# Patient Record
Sex: FEMALE | Race: WHITE | NOT HISPANIC OR LATINO | URBAN - METROPOLITAN AREA
[De-identification: names, ages, dates, MRNs, and addresses within clinical notes are randomized per-mention and may not be internally consistent; named-entity substitution may affect disease eponyms.]

---

## 2020-04-18 ENCOUNTER — PATIENT MESSAGE (OUTPATIENT)
Dept: OBSTETRICS AND GYNECOLOGY | Facility: CLINIC | Age: 35
End: 2020-04-18

## 2023-09-06 ENCOUNTER — OFFICE VISIT (OUTPATIENT)
Dept: SURGERY | Facility: CLINIC | Age: 38
End: 2023-09-06
Payer: COMMERCIAL

## 2023-09-06 VITALS
WEIGHT: 125 LBS | HEIGHT: 68 IN | RESPIRATION RATE: 16 BRPM | BODY MASS INDEX: 18.94 KG/M2 | SYSTOLIC BLOOD PRESSURE: 118 MMHG | DIASTOLIC BLOOD PRESSURE: 72 MMHG | HEART RATE: 72 BPM

## 2023-09-06 DIAGNOSIS — K80.20 GALLSTONES: Primary | ICD-10-CM

## 2023-09-06 DIAGNOSIS — Z72.0 TOBACCO USE: ICD-10-CM

## 2023-09-06 PROCEDURE — 99204 OFFICE O/P NEW MOD 45 MIN: CPT | Performed by: SURGERY

## 2023-09-06 NOTE — PATIENT INSTRUCTIONS
Steps to Quit Smoking  Smoking tobacco is the leading cause of preventable death. It can affect almost every organ in the body. Smoking puts you and those around you at risk for developing many serious chronic diseases.  Quitting smoking can be very challenging. Do not get discouraged if you are not successful the first time. Some people need to make many attempts to quit before they achieve long-term success. Do your best to stick to your quit plan, and talk with your health care provider if you have any questions or concerns.  How do I get ready to quit?  When you decide to quit smoking, create a plan to help you succeed. Before you quit:  Pick a date to quit. Set a date within the next 2 weeks to give you time to prepare.  Write down the reasons why you are quitting. Keep this list in places where you will see it often.  Tell your family, friends, and co-workers that you are quitting. Support from people you are close to can make quitting easier.  Talk with your health care provider about your options for quitting smoking.  Find out what treatment options are covered by your health insurance.  Identify people, places, things, and activities that make you want to smoke (triggers). Avoid them.  What first steps can I take to quit smoking?  Throw away all cigarettes at home, at work, and in your car.  Throw away smoking accessories, such as ashtrays and lighters.  Clean your car. Make sure to empty the ashtray.  Clean your home, including curtains and carpets.  What strategies can I use to quit smoking?  Talk with your health care provider about combining strategies, such as taking medicines while you are also receiving in-person counseling. Using these two strategies together makes you more likely to succeed in quitting than if you used either strategy on its own.  If you are pregnant or breastfeeding, talk with your health care provider about finding counseling or other support strategies to quit smoking. Do not  take medicine to help you quit smoking unless your health care provider tells you to.  Quit right away  Quit smoking completely, instead of gradually reducing how much you smoke over a period of time. Stopping smoking right away may be more successful than gradually quitting.  Attend in-person counseling to help you build problem-solving skills. You are more likely to succeed in quitting if you attend counseling sessions regularly. Even short sessions of 10 minutes can be effective.  Take medicine  You may take medicines to help you quit smoking. Some medicines require a prescription. You can also purchase over-the-counter medicines. Medicines may have nicotine in them to replace the nicotine in cigarettes. Medicines may:  Help to stop cravings.  Help to relieve withdrawal symptoms.  Your health care provider may recommend:  Nicotine patches, gum, or lozenges.  Nicotine inhalers or sprays.  Non-nicotine medicine that you take by mouth.  Find resources  Find resources and support systems that can help you quit smoking and remain smoke-free after you quit. These resources are most helpful when you use them often. They include:  Online chats with a counselor.  Telephone quitlines.  Printed self-help materials.  Support groups or group counseling.  Text messaging programs.  Mobile phone apps or applications. Use apps that can help you stick to your quit plan by providing reminders, tips, and encouragement. Examples of free services include Quit Guide from the CDC and smokefree.gov    What can I do to make it easier to quit?    Reach out to your family and friends for support and encouragement. Call telephone quitlines, such as 1800-QUIT-NOW, reach out to support groups, or work with a counselor for support.  Ask people who smoke to avoid smoking around you.  Avoid places that trigger you to smoke, such as bars, parties, or smoke-break areas at work.  Spend time with people who do not smoke.  Lessen the stress in your  life. Stress can be a smoking trigger for some people. To lessen stress, try:  Exercising regularly.  Doing deep-breathing exercises.  Doing yoga.  Meditating.  What benefits will I see if I quit smoking?  Over time, you should start to see positive results, such as:  Improved sense of smell and taste.  Decreased coughing and sore throat.  Slower heart rate.  Lower blood pressure.  Clearer and healthier skin.  The ability to breathe more easily.  Fewer sick days.  Summary  Quitting smoking can be very challenging. Do not get discouraged if you are not successful the first time. Some people need to make many attempts to quit before they achieve long-term success.  When you decide to quit smoking, create a plan to help you succeed.  Quit smoking right away, not slowly over a period of time.  Find resources and support systems that can help you quit smoking and remain smoke-free after you quit.  This information is not intended to replace advice given to you by your health care provider. Make sure you discuss any questions you have with your health care provider.  Document Revised: 12/09/2022 Document Reviewed: 12/09/2022  Elsevier Patient Education © 2023 [employer]

## 2023-09-06 NOTE — H&P
Roldan Horton 38 y.o. female presents @ the req of ESSENCE Torres for eval of gallstones.  Pt reports + diarrhea after meals.   Chief Complaint   Patient presents with    Cholelithiasis             HPI   Above noted and agree.  This very pleasant 38-year-old female has had abdominal issues for quite some time.  She been having issues right upper quadrant pain and nausea and bloating.  She also had issues with diarrhea.  She is very healthy and has no medical issues.  She does smoke cigarettes.  She had an ultrasound of the abdomen that showed gallstones.      Review of Systems   All other systems reviewed and are negative.          Current Outpatient Medications:     folic acid (FOLVITE) 1 MG tablet, Take 1 tablet by mouth Daily., Disp: , Rfl:     vitamin D (ERGOCALCIFEROL) 1.25 MG (75840 UT) capsule capsule, Take 1 capsule by mouth 1 (One) Time Per Week., Disp: , Rfl:         No Known Allergies        History reviewed. No pertinent past medical history.        Past Surgical History:   Procedure Laterality Date    HEMORRHOIDECTOMY             Social History     Tobacco Use    Smoking status: Every Day     Types: Cigarettes    Smokeless tobacco: Never   Substance Use Topics    Alcohol use: Yes             There is no immunization history on file for this patient.        Physical Exam  Vitals and nursing note reviewed.   Constitutional:       Appearance: Normal appearance.   HENT:      Head: Normocephalic and atraumatic.   Cardiovascular:      Rate and Rhythm: Normal rate and regular rhythm.   Pulmonary:      Effort: Pulmonary effort is normal.      Breath sounds: Normal breath sounds.   Abdominal:      General: Bowel sounds are normal.      Palpations: Abdomen is soft.   Musculoskeletal:         General: No swelling or tenderness.   Skin:     General: Skin is warm and dry.   Neurological:      General: No focal deficit present.      Mental Status: She is alert and oriented to person, place, and time.  "  Psychiatric:         Mood and Affect: Mood normal.         Behavior: Behavior normal.       Debilities/Disabilities Identified: None    Emotional Behavior: Appropriate      /72   Pulse 72   Resp 16   Ht 172.7 cm (68\")   Wt 56.7 kg (125 lb)   BMI 19.01 kg/m²         Diagnoses and all orders for this visit:    1. Gallstones (Primary)    I discussed with the patient the benefits and risks of performing a laparoscopic cholecystectomy with intraoperative cholangiogram possible open procedure.  Benefits and risks not limited to but including: Bleeding, infection, hernia formation, having to convert to an open procedure, injury to intra-abdominal structures, intra-abdominal abscess, intra-abdominal biloma, bile leak, DVT, PE, atelectasis, pneumonia, anesthetic complications.  The patient appeared to understand and is willing to proceed.    Thank you for allowing me to participate in the care of this interesting patient.          "

## 2023-09-06 NOTE — LETTER
September 6, 2023       No Recipients    Patient: Roldan Horton   YOB: 1985   Date of Visit: 9/6/2023     Dear ESSENCE Kenny:       Thank you for referring Roldan Horton to me for evaluation. Below are the relevant portions of my assessment and plan of care.    If you have questions, please do not hesitate to call me. I look forward to following Roldan along with you.         Sincerely,        Montse Anderson DO        CC:   No Recipients    Montse Anderson DO  09/06/23 1535  Sign when Signing Visit  Roldan Horton 38 y.o. female presents @ the req of ESSENCE Torres for eval of gallstones.  Pt reports + diarrhea after meals.   Chief Complaint   Patient presents with   • Cholelithiasis             HPI   Above noted and agree.  This very pleasant 38-year-old female has had abdominal issues for quite some time.  She been having issues right upper quadrant pain and nausea and bloating.  She also had issues with diarrhea.  She is very healthy and has no medical issues.  She does smoke cigarettes.  She had an ultrasound of the abdomen that showed gallstones.      Review of Systems   All other systems reviewed and are negative.          Current Outpatient Medications:   •  folic acid (FOLVITE) 1 MG tablet, Take 1 tablet by mouth Daily., Disp: , Rfl:   •  vitamin D (ERGOCALCIFEROL) 1.25 MG (29011 UT) capsule capsule, Take 1 capsule by mouth 1 (One) Time Per Week., Disp: , Rfl:         No Known Allergies        History reviewed. No pertinent past medical history.        Past Surgical History:   Procedure Laterality Date   • HEMORRHOIDECTOMY             Social History     Tobacco Use   • Smoking status: Every Day     Types: Cigarettes   • Smokeless tobacco: Never   Substance Use Topics   • Alcohol use: Yes             There is no immunization history on file for this patient.        Physical Exam  Vitals and nursing note reviewed.   Constitutional:       Appearance: Normal appearance.  "  HENT:      Head: Normocephalic and atraumatic.   Cardiovascular:      Rate and Rhythm: Normal rate and regular rhythm.   Pulmonary:      Effort: Pulmonary effort is normal.      Breath sounds: Normal breath sounds.   Abdominal:      General: Bowel sounds are normal.      Palpations: Abdomen is soft.   Musculoskeletal:         General: No swelling or tenderness.   Skin:     General: Skin is warm and dry.   Neurological:      General: No focal deficit present.      Mental Status: She is alert and oriented to person, place, and time.   Psychiatric:         Mood and Affect: Mood normal.         Behavior: Behavior normal.       Debilities/Disabilities Identified: None    Emotional Behavior: Appropriate      /72   Pulse 72   Resp 16   Ht 172.7 cm (68\")   Wt 56.7 kg (125 lb)   BMI 19.01 kg/m²         Diagnoses and all orders for this visit:    1. Gallstones (Primary)    I discussed with the patient the benefits and risks of performing a laparoscopic cholecystectomy with intraoperative cholangiogram possible open procedure.  Benefits and risks not limited to but including: Bleeding, infection, hernia formation, having to convert to an open procedure, injury to intra-abdominal structures, intra-abdominal abscess, intra-abdominal biloma, bile leak, DVT, PE, atelectasis, pneumonia, anesthetic complications.  The patient appeared to understand and is willing to proceed.    Thank you for allowing me to participate in the care of this interesting patient.        "

## 2023-09-06 NOTE — PROGRESS NOTES
Roldan Horton 38 y.o. female presents @ the req of ESSENCE Torres for eval of gallstones.  Pt reports + diarrhea after meals.   Chief Complaint   Patient presents with    Cholelithiasis             HPI   Above noted and agree.  This very pleasant 38-year-old female has had abdominal issues for quite some time.  She been having issues right upper quadrant pain and nausea and bloating.  She also had issues with diarrhea.  She is very healthy and has no medical issues.  She does smoke cigarettes.  She had an ultrasound of the abdomen that showed gallstones.      Review of Systems   All other systems reviewed and are negative.          Current Outpatient Medications:     folic acid (FOLVITE) 1 MG tablet, Take 1 tablet by mouth Daily., Disp: , Rfl:     vitamin D (ERGOCALCIFEROL) 1.25 MG (29246 UT) capsule capsule, Take 1 capsule by mouth 1 (One) Time Per Week., Disp: , Rfl:         No Known Allergies        History reviewed. No pertinent past medical history.        Past Surgical History:   Procedure Laterality Date    HEMORRHOIDECTOMY             Social History     Tobacco Use    Smoking status: Every Day     Types: Cigarettes    Smokeless tobacco: Never   Substance Use Topics    Alcohol use: Yes             There is no immunization history on file for this patient.        Physical Exam  Vitals and nursing note reviewed.   Constitutional:       Appearance: Normal appearance.   HENT:      Head: Normocephalic and atraumatic.   Cardiovascular:      Rate and Rhythm: Normal rate and regular rhythm.   Pulmonary:      Effort: Pulmonary effort is normal.      Breath sounds: Normal breath sounds.   Abdominal:      General: Bowel sounds are normal.      Palpations: Abdomen is soft.   Musculoskeletal:         General: No swelling or tenderness.   Skin:     General: Skin is warm and dry.   Neurological:      General: No focal deficit present.      Mental Status: She is alert and oriented to person, place, and time.  "  Psychiatric:         Mood and Affect: Mood normal.         Behavior: Behavior normal.       Debilities/Disabilities Identified: None    Emotional Behavior: Appropriate      /72   Pulse 72   Resp 16   Ht 172.7 cm (68\")   Wt 56.7 kg (125 lb)   BMI 19.01 kg/m²         Diagnoses and all orders for this visit:    1. Gallstones (Primary)    I discussed with the patient the benefits and risks of performing a laparoscopic cholecystectomy with intraoperative cholangiogram possible open procedure.  Benefits and risks not limited to but including: Bleeding, infection, hernia formation, having to convert to an open procedure, injury to intra-abdominal structures, intra-abdominal abscess, intra-abdominal biloma, bile leak, DVT, PE, atelectasis, pneumonia, anesthetic complications.  The patient appeared to understand and is willing to proceed.    Thank you for allowing me to participate in the care of this interesting patient.        "

## 2023-09-07 PROBLEM — K80.20 GALLSTONES: Status: ACTIVE | Noted: 2023-09-07

## 2023-09-08 ENCOUNTER — PATIENT ROUNDING (BHMG ONLY) (OUTPATIENT)
Dept: SURGERY | Facility: CLINIC | Age: 38
End: 2023-09-08
Payer: COMMERCIAL

## 2023-09-08 NOTE — PROGRESS NOTES
September 8, 2023    Hello, may I speak with Roldan Horton?    My name is TAM      I am  with [facility]  [facility]  [address] [place] [address]  [phone].    Before we get started may I verify your date of birth? 1985    I am calling to officially welcome you to our practice and ask about your recent visit. Is this a good time to talk? yes    Tell me about your visit with us. What things went well?  SHE WAS PLEASED WITH HER VISIT       We're always looking for ways to make our patients' experiences even better. Do you have recommendations on ways we may improve?  no    Overall were you satisfied with your first visit to our practice? yes       I appreciate you taking the time to speak with me today. Is there anything else I can do for you? no      Thank you, and have a great day.

## 2023-09-28 ENCOUNTER — TELEPHONE (OUTPATIENT)
Dept: SURGERY | Facility: CLINIC | Age: 38
End: 2023-09-28
Payer: COMMERCIAL

## 2023-09-28 NOTE — TELEPHONE ENCOUNTER
Pt called and states she and her daughter are ill and cx surg.  Pt will call when ready to resched.     SG Sched notified.